# Patient Record
Sex: FEMALE | Race: ASIAN | NOT HISPANIC OR LATINO | URBAN - METROPOLITAN AREA
[De-identification: names, ages, dates, MRNs, and addresses within clinical notes are randomized per-mention and may not be internally consistent; named-entity substitution may affect disease eponyms.]

---

## 2020-05-26 LAB
ABORH_EXT (HISTORICAL CONVERSION): NORMAL
ANTIBODY_EXT (HISTORICAL CONVERSION): NORMAL

## 2020-10-06 LAB
ABORH_EXT (HISTORICAL CONVERSION): NORMAL
ANTIBODY_EXT (HISTORICAL CONVERSION): NORMAL
HBSAG_EXT (HISTORICAL CONVERSION): NORMAL
HCV AB SERPL QL IA: NORMAL
HGB_EXT (HISTORICAL CONVERSION): 10.8
HIV_EXT: NORMAL
PLT_EXT - HISTORICAL: 267
RUBELLA_EXT (HISTORICAL CONVERSION): NORMAL

## 2020-11-25 LAB — GBS_EXT (HISTORICAL CONVERSION): POSITIVE

## 2020-12-28 ENCOUNTER — HOSPITAL ENCOUNTER (OUTPATIENT)
Dept: OBGYN | Facility: HOSPITAL | Age: 35
Discharge: HOME | End: 2020-12-28
Attending: OBSTETRICS & GYNECOLOGY | Admitting: ADVANCED PRACTICE MIDWIFE

## 2020-12-28 LAB — SARS-COV-2 PCR RESULT-HE - HISTORICAL: NEGATIVE

## 2020-12-28 ASSESSMENT — MIFFLIN-ST. JEOR: SCORE: 1517.35

## 2020-12-31 ENCOUNTER — HOME CARE/HOSPICE - HEALTHEAST (OUTPATIENT)
Dept: HOME HEALTH SERVICES | Facility: HOME HEALTH | Age: 35
End: 2020-12-31

## 2021-01-03 ENCOUNTER — HOME CARE/HOSPICE - HEALTHEAST (OUTPATIENT)
Dept: HOME HEALTH SERVICES | Facility: HOME HEALTH | Age: 36
End: 2021-01-03

## 2021-02-24 ENCOUNTER — COMMUNICATION - HEALTHEAST (OUTPATIENT)
Dept: ADMINISTRATIVE | Facility: CLINIC | Age: 36
End: 2021-02-24

## 2021-05-27 VITALS
TEMPERATURE: 98.2 F | DIASTOLIC BLOOD PRESSURE: 72 MMHG | SYSTOLIC BLOOD PRESSURE: 118 MMHG | HEART RATE: 70 BPM | RESPIRATION RATE: 18 BRPM

## 2021-06-05 VITALS — HEIGHT: 65 IN | WEIGHT: 180 LBS | BODY MASS INDEX: 29.99 KG/M2

## 2021-06-14 NOTE — PROGRESS NOTES
Spoke with Cullen REYES, she will be back to [facility] this afternoon to discuss plans with patient.  ERIC spoke with Dr Rosales who recommends patient stay to be induced - Cullen REYES would like to talk with patient about plans and what her and spouse would like to do after BPP at 1700.  Will let oncoming RN know the plans.

## 2021-06-14 NOTE — DISCHARGE SUMMARY
"S: Nicole is sitting up in bed, comfortable.  SO at bedside.  O: /72 (Patient Position: Semi-glover, Cuff Size: Adult Regular)   Pulse 91   Temp 98.3  F (36.8  C) (Oral)   Resp 16   Ht 5' 5\" (1.651 m)   Wt 180 lb (81.6 kg)   SpO2 98%   BMI 29.95 kg/m    FHR Cat I  Ctx occ mild  Cx 1/60/-1  BPP 8/8  A: IUP at 40w5d with BPP 4/8 in clinic and repeat 10/10 in hospital  P: Lengthy discussion on benefit and risks again with IOL.  Disc post dates gestation and little to gain with waiting for labor.  Disc potential risks to baby.  Disc recommended plan for IOL including ripening tonight and pitocin tomorrow if cervix is favorable.  Pt and  spoke in private with her .  Pt states that she was not prepared for IOL today and in general wishes to avoid IOL.  Pt feels reassured with repeat BPP and would like to go home tonight.  Pt agrees to schedule IOL in a few days if she does not start labor before that time.  Pt has clinic appt tomorrow.  IOL sched for Thursday.  Covid collected tonight.      YANCI Madrid,ERIC    "

## 2021-06-14 NOTE — PROGRESS NOTES
"S: iNcole is a 36yo  at 40wks 5d who presents to [facility] for evaluation after having a BPP in clinic today .  Off for movement and tone.  Indication for the BPP was 40wks pregnancy.  Nicole denies any pain, cramping, vaginal bleeding, leaking of fluid.  Pt reports that she does feel fetal movement. Pt is accompanied by her .  O: /72 (Patient Position: Semi-glover, Cuff Size: Adult Regular)   Pulse 91   Temp 98.3  F (36.8  C) (Oral)   Resp 16   Ht 5' 5\" (1.651 m)   Wt 180 lb (81.6 kg)   SpO2 98%   BMI 29.95 kg/m    FHR Cat I +accels   Ctx occasional, mild to palp  Cx exam deferred at this time  A: IUP at 40w5d with BPP  in clinic today  P: Disc with patient and her  that the ob/gyn on call recommends induction of labor due to her gestational age and biophysical score earlier.  Disc with pt that we could proceed with IOL at this time.  Pt states that she was not planning that and she would like to have the baby checked again and think about it.  Pt to US.  Will discuss with pt ultrasound findings and benefit/risks of going home vs induction of labor    Regina Berman CNM  "

## 2021-06-14 NOTE — PROGRESS NOTES
Patient arrived from clinic for evaluation after BPP 4/8.  Monitors placed x2 and patient and spouse oriented to room.

## 2021-06-14 NOTE — PLAN OF CARE
Reactive NST noted with multiple large accels and no decelerations. BPP showed 8/8 results. CNM discussing results with pt. For plan of care.  Tresa Knight Rn

## 2021-06-16 PROBLEM — Z34.90 PREGNANT: Status: ACTIVE | Noted: 2020-12-29

## 2022-04-05 LAB
ABO (EXTERNAL): NORMAL
HEMOGLOBIN (EXTERNAL): 12 G/DL (ref 11.7–15.5)
HEPATITIS B SURFACE ANTIGEN (EXTERNAL): NONREACTIVE
HEPATITIS C ANTIBODY (EXTERNAL): NONREACTIVE
HIV1+2 AB SERPL QL IA: NONREACTIVE
PLATELET COUNT (EXTERNAL): 289 10E3/UL (ref 140–400)
RH (EXTERNAL): POSITIVE
RUBELLA ANTIBODY IGG (EXTERNAL): NORMAL
TREPONEMA PALLIDUM ANTIBODY (EXTERNAL): NONREACTIVE

## 2022-08-31 LAB
HEMOGLOBIN (EXTERNAL): 10.8 G/DL (ref 11.7–15.5)
PLATELET COUNT (EXTERNAL): 248 10E3/UL (ref 140–400)

## 2022-10-27 LAB — GROUP B STREPTOCOCCUS (EXTERNAL): NEGATIVE

## 2022-11-11 ENCOUNTER — HOSPITAL ENCOUNTER (INPATIENT)
Facility: CLINIC | Age: 37
LOS: 2 days | Discharge: HOME | End: 2022-11-13
Attending: ADVANCED PRACTICE MIDWIFE | Admitting: ADVANCED PRACTICE MIDWIFE
Payer: COMMERCIAL

## 2022-11-11 PROBLEM — O42.90 AMNIOTIC FLUID LEAKING: Status: ACTIVE | Noted: 2022-11-11

## 2022-11-11 LAB
ABO/RH(D): NORMAL
ANTIBODY SCREEN: NEGATIVE
ERYTHROCYTE [DISTWIDTH] IN BLOOD BY AUTOMATED COUNT: 13.7 % (ref 10–15)
HCT VFR BLD AUTO: 34.6 % (ref 35–47)
HGB BLD-MCNC: 11.6 G/DL (ref 11.7–15.7)
MCH RBC QN AUTO: 27 PG (ref 26.5–33)
MCHC RBC AUTO-ENTMCNC: 33.5 G/DL (ref 31.5–36.5)
MCV RBC AUTO: 81 FL (ref 78–100)
PLATELET # BLD AUTO: 261 10E3/UL (ref 150–450)
RBC # BLD AUTO: 4.29 10E6/UL (ref 3.8–5.2)
RUPTURE OF FETAL MEMBRANES BY ROM PLUS: POSITIVE
SARS-COV-2 RNA RESP QL NAA+PROBE: NEGATIVE
SPECIMEN EXPIRATION DATE: NORMAL
WBC # BLD AUTO: 15.6 10E3/UL (ref 4–11)

## 2022-11-11 PROCEDURE — 120N000001 HC R&B MED SURG/OB

## 2022-11-11 PROCEDURE — 86780 TREPONEMA PALLIDUM: CPT | Performed by: ADVANCED PRACTICE MIDWIFE

## 2022-11-11 PROCEDURE — 250N000009 HC RX 250: Performed by: ADVANCED PRACTICE MIDWIFE

## 2022-11-11 PROCEDURE — 36415 COLL VENOUS BLD VENIPUNCTURE: CPT | Performed by: ADVANCED PRACTICE MIDWIFE

## 2022-11-11 PROCEDURE — 85027 COMPLETE CBC AUTOMATED: CPT | Performed by: ADVANCED PRACTICE MIDWIFE

## 2022-11-11 PROCEDURE — 258N000003 HC RX IP 258 OP 636: Performed by: ADVANCED PRACTICE MIDWIFE

## 2022-11-11 PROCEDURE — 86850 RBC ANTIBODY SCREEN: CPT | Performed by: ADVANCED PRACTICE MIDWIFE

## 2022-11-11 PROCEDURE — 3E033VJ INTRODUCTION OF OTHER HORMONE INTO PERIPHERAL VEIN, PERCUTANEOUS APPROACH: ICD-10-PCS | Performed by: ADVANCED PRACTICE MIDWIFE

## 2022-11-11 PROCEDURE — U0003 INFECTIOUS AGENT DETECTION BY NUCLEIC ACID (DNA OR RNA); SEVERE ACUTE RESPIRATORY SYNDROME CORONAVIRUS 2 (SARS-COV-2) (CORONAVIRUS DISEASE [COVID-19]), AMPLIFIED PROBE TECHNIQUE, MAKING USE OF HIGH THROUGHPUT TECHNOLOGIES AS DESCRIBED BY CMS-2020-01-R: HCPCS | Performed by: ADVANCED PRACTICE MIDWIFE

## 2022-11-11 PROCEDURE — 84112 EVAL AMNIOTIC FLUID PROTEIN: CPT | Performed by: ADVANCED PRACTICE MIDWIFE

## 2022-11-11 PROCEDURE — 86901 BLOOD TYPING SEROLOGIC RH(D): CPT | Performed by: ADVANCED PRACTICE MIDWIFE

## 2022-11-11 RX ORDER — OXYTOCIN/0.9 % SODIUM CHLORIDE 30/500 ML
100-340 PLASTIC BAG, INJECTION (ML) INTRAVENOUS CONTINUOUS PRN
Status: DISCONTINUED | OUTPATIENT
Start: 2022-11-11 | End: 2022-11-13 | Stop reason: HOSPADM

## 2022-11-11 RX ORDER — OXYTOCIN/0.9 % SODIUM CHLORIDE 30/500 ML
340 PLASTIC BAG, INJECTION (ML) INTRAVENOUS CONTINUOUS PRN
Status: COMPLETED | OUTPATIENT
Start: 2022-11-11 | End: 2022-11-12

## 2022-11-11 RX ORDER — LIDOCAINE 40 MG/G
CREAM TOPICAL
Status: DISCONTINUED | OUTPATIENT
Start: 2022-11-11 | End: 2022-11-12 | Stop reason: HOSPADM

## 2022-11-11 RX ORDER — PROCHLORPERAZINE MALEATE 10 MG
10 TABLET ORAL EVERY 6 HOURS PRN
Status: DISCONTINUED | OUTPATIENT
Start: 2022-11-11 | End: 2022-11-12 | Stop reason: HOSPADM

## 2022-11-11 RX ORDER — LIDOCAINE 40 MG/G
CREAM TOPICAL
Status: DISCONTINUED | OUTPATIENT
Start: 2022-11-11 | End: 2022-11-11 | Stop reason: HOSPADM

## 2022-11-11 RX ORDER — METHYLERGONOVINE MALEATE 0.2 MG/ML
200 INJECTION INTRAVENOUS
Status: DISCONTINUED | OUTPATIENT
Start: 2022-11-11 | End: 2022-11-12 | Stop reason: HOSPADM

## 2022-11-11 RX ORDER — ACETAMINOPHEN 325 MG/1
650 TABLET ORAL EVERY 4 HOURS PRN
Status: DISCONTINUED | OUTPATIENT
Start: 2022-11-11 | End: 2022-11-12 | Stop reason: HOSPADM

## 2022-11-11 RX ORDER — NALOXONE HYDROCHLORIDE 0.4 MG/ML
0.4 INJECTION, SOLUTION INTRAMUSCULAR; INTRAVENOUS; SUBCUTANEOUS
Status: DISCONTINUED | OUTPATIENT
Start: 2022-11-11 | End: 2022-11-12 | Stop reason: HOSPADM

## 2022-11-11 RX ORDER — ONDANSETRON 4 MG/1
4 TABLET, ORALLY DISINTEGRATING ORAL EVERY 6 HOURS PRN
Status: DISCONTINUED | OUTPATIENT
Start: 2022-11-11 | End: 2022-11-12 | Stop reason: HOSPADM

## 2022-11-11 RX ORDER — METOCLOPRAMIDE 10 MG/1
10 TABLET ORAL EVERY 6 HOURS PRN
Status: DISCONTINUED | OUTPATIENT
Start: 2022-11-11 | End: 2022-11-12 | Stop reason: HOSPADM

## 2022-11-11 RX ORDER — NALOXONE HYDROCHLORIDE 0.4 MG/ML
0.2 INJECTION, SOLUTION INTRAMUSCULAR; INTRAVENOUS; SUBCUTANEOUS
Status: DISCONTINUED | OUTPATIENT
Start: 2022-11-11 | End: 2022-11-12 | Stop reason: HOSPADM

## 2022-11-11 RX ORDER — OXYTOCIN/0.9 % SODIUM CHLORIDE 30/500 ML
1-24 PLASTIC BAG, INJECTION (ML) INTRAVENOUS CONTINUOUS
Status: DISCONTINUED | OUTPATIENT
Start: 2022-11-11 | End: 2022-11-12 | Stop reason: HOSPADM

## 2022-11-11 RX ORDER — IBUPROFEN 800 MG/1
800 TABLET, FILM COATED ORAL
Status: DISCONTINUED | OUTPATIENT
Start: 2022-11-11 | End: 2022-11-13 | Stop reason: HOSPADM

## 2022-11-11 RX ORDER — CARBOPROST TROMETHAMINE 250 UG/ML
250 INJECTION, SOLUTION INTRAMUSCULAR
Status: DISCONTINUED | OUTPATIENT
Start: 2022-11-11 | End: 2022-11-12 | Stop reason: HOSPADM

## 2022-11-11 RX ORDER — OXYCODONE HYDROCHLORIDE 5 MG/1
5 TABLET ORAL
Status: DISCONTINUED | OUTPATIENT
Start: 2022-11-11 | End: 2022-11-13 | Stop reason: HOSPADM

## 2022-11-11 RX ORDER — MISOPROSTOL 200 UG/1
800 TABLET ORAL
Status: DISCONTINUED | OUTPATIENT
Start: 2022-11-11 | End: 2022-11-12 | Stop reason: HOSPADM

## 2022-11-11 RX ORDER — KETOROLAC TROMETHAMINE 30 MG/ML
30 INJECTION, SOLUTION INTRAMUSCULAR; INTRAVENOUS
Status: DISCONTINUED | OUTPATIENT
Start: 2022-11-11 | End: 2022-11-13 | Stop reason: HOSPADM

## 2022-11-11 RX ORDER — METOCLOPRAMIDE HYDROCHLORIDE 5 MG/ML
10 INJECTION INTRAMUSCULAR; INTRAVENOUS EVERY 6 HOURS PRN
Status: DISCONTINUED | OUTPATIENT
Start: 2022-11-11 | End: 2022-11-12 | Stop reason: HOSPADM

## 2022-11-11 RX ORDER — FENTANYL CITRATE 50 UG/ML
50 INJECTION, SOLUTION INTRAMUSCULAR; INTRAVENOUS EVERY 30 MIN PRN
Status: DISCONTINUED | OUTPATIENT
Start: 2022-11-11 | End: 2022-11-12 | Stop reason: HOSPADM

## 2022-11-11 RX ORDER — SODIUM CHLORIDE, SODIUM LACTATE, POTASSIUM CHLORIDE, CALCIUM CHLORIDE 600; 310; 30; 20 MG/100ML; MG/100ML; MG/100ML; MG/100ML
INJECTION, SOLUTION INTRAVENOUS CONTINUOUS PRN
Status: DISCONTINUED | OUTPATIENT
Start: 2022-11-11 | End: 2022-11-12 | Stop reason: HOSPADM

## 2022-11-11 RX ORDER — MISOPROSTOL 200 UG/1
400 TABLET ORAL
Status: DISCONTINUED | OUTPATIENT
Start: 2022-11-11 | End: 2022-11-12 | Stop reason: HOSPADM

## 2022-11-11 RX ORDER — ONDANSETRON 2 MG/ML
4 INJECTION INTRAMUSCULAR; INTRAVENOUS EVERY 6 HOURS PRN
Status: DISCONTINUED | OUTPATIENT
Start: 2022-11-11 | End: 2022-11-12 | Stop reason: HOSPADM

## 2022-11-11 RX ORDER — TERBUTALINE SULFATE 1 MG/ML
0.25 INJECTION, SOLUTION SUBCUTANEOUS
Status: DISCONTINUED | OUTPATIENT
Start: 2022-11-11 | End: 2022-11-12 | Stop reason: HOSPADM

## 2022-11-11 RX ORDER — OXYTOCIN 10 [USP'U]/ML
10 INJECTION, SOLUTION INTRAMUSCULAR; INTRAVENOUS
Status: DISCONTINUED | OUTPATIENT
Start: 2022-11-11 | End: 2022-11-12 | Stop reason: HOSPADM

## 2022-11-11 RX ORDER — CITRIC ACID/SODIUM CITRATE 334-500MG
30 SOLUTION, ORAL ORAL
Status: DISCONTINUED | OUTPATIENT
Start: 2022-11-11 | End: 2022-11-12 | Stop reason: HOSPADM

## 2022-11-11 RX ORDER — OXYTOCIN 10 [USP'U]/ML
10 INJECTION, SOLUTION INTRAMUSCULAR; INTRAVENOUS
Status: DISCONTINUED | OUTPATIENT
Start: 2022-11-11 | End: 2022-11-13 | Stop reason: HOSPADM

## 2022-11-11 RX ORDER — PROCHLORPERAZINE 25 MG
25 SUPPOSITORY, RECTAL RECTAL EVERY 12 HOURS PRN
Status: DISCONTINUED | OUTPATIENT
Start: 2022-11-11 | End: 2022-11-12 | Stop reason: HOSPADM

## 2022-11-11 RX ADMIN — SODIUM CHLORIDE, POTASSIUM CHLORIDE, SODIUM LACTATE AND CALCIUM CHLORIDE: 600; 310; 30; 20 INJECTION, SOLUTION INTRAVENOUS at 20:31

## 2022-11-11 RX ADMIN — Medication 2 MILLI-UNITS/MIN: at 21:05

## 2022-11-11 ASSESSMENT — ACTIVITIES OF DAILY LIVING (ADL)
DIFFICULTY_COMMUNICATING: NO
ADLS_ACUITY_SCORE: 20
ADLS_ACUITY_SCORE: 20
DRESSING/BATHING_DIFFICULTY: NO
ADLS_ACUITY_SCORE: 35
CONCENTRATING,_REMEMBERING_OR_MAKING_DECISIONS_DIFFICULTY: NO
DOING_ERRANDS_INDEPENDENTLY_DIFFICULTY: NO
DIFFICULTY_EATING/SWALLOWING: NO
WEAR_GLASSES_OR_BLIND: YES
WALKING_OR_CLIMBING_STAIRS_DIFFICULTY: NO
VISION_MANAGEMENT: GLASSES
FALL_HISTORY_WITHIN_LAST_SIX_MONTHS: NO
ADLS_ACUITY_SCORE: 20
TOILETING_ISSUES: NO
HEARING_DIFFICULTY_OR_DEAF: NO
CHANGE_IN_FUNCTIONAL_STATUS_SINCE_ONSET_OF_CURRENT_ILLNESS/INJURY: NO

## 2022-11-11 NOTE — PROGRESS NOTES
Nicole is here to rule out rupture of membranes.  Nicole states at 1000 this AM she felt a gush and has since experienced more leaking.  Nicole denies having any painful contractions.  ROM+ collected.

## 2022-11-12 LAB — T PALLIDUM AB SER QL: NONREACTIVE

## 2022-11-12 PROCEDURE — 120N000001 HC R&B MED SURG/OB

## 2022-11-12 PROCEDURE — 250N000009 HC RX 250: Performed by: ADVANCED PRACTICE MIDWIFE

## 2022-11-12 PROCEDURE — C9803 HOPD COVID-19 SPEC COLLECT: HCPCS

## 2022-11-12 PROCEDURE — 0HQ9XZZ REPAIR PERINEUM SKIN, EXTERNAL APPROACH: ICD-10-PCS | Performed by: ADVANCED PRACTICE MIDWIFE

## 2022-11-12 PROCEDURE — 250N000013 HC RX MED GY IP 250 OP 250 PS 637: Performed by: ADVANCED PRACTICE MIDWIFE

## 2022-11-12 PROCEDURE — 722N000001 HC LABOR CARE VAGINAL DELIVERY SINGLE

## 2022-11-12 PROCEDURE — 250N000011 HC RX IP 250 OP 636: Performed by: ADVANCED PRACTICE MIDWIFE

## 2022-11-12 RX ORDER — DOCUSATE SODIUM 100 MG/1
100 CAPSULE, LIQUID FILLED ORAL DAILY
Status: DISCONTINUED | OUTPATIENT
Start: 2022-11-12 | End: 2022-11-13 | Stop reason: HOSPADM

## 2022-11-12 RX ORDER — NALOXONE HYDROCHLORIDE 0.4 MG/ML
0.2 INJECTION, SOLUTION INTRAMUSCULAR; INTRAVENOUS; SUBCUTANEOUS
Status: DISCONTINUED | OUTPATIENT
Start: 2022-11-12 | End: 2022-11-13 | Stop reason: HOSPADM

## 2022-11-12 RX ORDER — MISOPROSTOL 200 UG/1
400 TABLET ORAL
Status: DISCONTINUED | OUTPATIENT
Start: 2022-11-12 | End: 2022-11-13 | Stop reason: HOSPADM

## 2022-11-12 RX ORDER — ACETAMINOPHEN 325 MG/1
650 TABLET ORAL EVERY 4 HOURS PRN
Status: DISCONTINUED | OUTPATIENT
Start: 2022-11-12 | End: 2022-11-13 | Stop reason: HOSPADM

## 2022-11-12 RX ORDER — IBUPROFEN 800 MG/1
800 TABLET, FILM COATED ORAL EVERY 6 HOURS PRN
Status: DISCONTINUED | OUTPATIENT
Start: 2022-11-12 | End: 2022-11-13 | Stop reason: HOSPADM

## 2022-11-12 RX ORDER — OXYCODONE HYDROCHLORIDE 5 MG/1
5 TABLET ORAL EVERY 4 HOURS PRN
Status: DISCONTINUED | OUTPATIENT
Start: 2022-11-12 | End: 2022-11-13 | Stop reason: HOSPADM

## 2022-11-12 RX ORDER — MISOPROSTOL 200 UG/1
800 TABLET ORAL
Status: DISCONTINUED | OUTPATIENT
Start: 2022-11-12 | End: 2022-11-13 | Stop reason: HOSPADM

## 2022-11-12 RX ORDER — NALOXONE HYDROCHLORIDE 0.4 MG/ML
0.4 INJECTION, SOLUTION INTRAMUSCULAR; INTRAVENOUS; SUBCUTANEOUS
Status: DISCONTINUED | OUTPATIENT
Start: 2022-11-12 | End: 2022-11-13 | Stop reason: HOSPADM

## 2022-11-12 RX ORDER — HYDROCORTISONE 25 MG/G
CREAM TOPICAL 3 TIMES DAILY PRN
Status: DISCONTINUED | OUTPATIENT
Start: 2022-11-12 | End: 2022-11-13 | Stop reason: HOSPADM

## 2022-11-12 RX ORDER — MODIFIED LANOLIN
OINTMENT (GRAM) TOPICAL
Status: DISCONTINUED | OUTPATIENT
Start: 2022-11-12 | End: 2022-11-13 | Stop reason: HOSPADM

## 2022-11-12 RX ORDER — METHYLERGONOVINE MALEATE 0.2 MG/ML
200 INJECTION INTRAVENOUS
Status: DISCONTINUED | OUTPATIENT
Start: 2022-11-12 | End: 2022-11-13 | Stop reason: HOSPADM

## 2022-11-12 RX ORDER — CARBOPROST TROMETHAMINE 250 UG/ML
250 INJECTION, SOLUTION INTRAMUSCULAR
Status: DISCONTINUED | OUTPATIENT
Start: 2022-11-12 | End: 2022-11-13 | Stop reason: HOSPADM

## 2022-11-12 RX ORDER — BISACODYL 10 MG
10 SUPPOSITORY, RECTAL RECTAL DAILY PRN
Status: DISCONTINUED | OUTPATIENT
Start: 2022-11-12 | End: 2022-11-13 | Stop reason: HOSPADM

## 2022-11-12 RX ORDER — OXYTOCIN 10 [USP'U]/ML
10 INJECTION, SOLUTION INTRAMUSCULAR; INTRAVENOUS
Status: DISCONTINUED | OUTPATIENT
Start: 2022-11-12 | End: 2022-11-13 | Stop reason: HOSPADM

## 2022-11-12 RX ORDER — OXYTOCIN/0.9 % SODIUM CHLORIDE 30/500 ML
340 PLASTIC BAG, INJECTION (ML) INTRAVENOUS CONTINUOUS PRN
Status: DISCONTINUED | OUTPATIENT
Start: 2022-11-12 | End: 2022-11-13 | Stop reason: HOSPADM

## 2022-11-12 RX ADMIN — KETOROLAC TROMETHAMINE 30 MG: 30 INJECTION, SOLUTION INTRAMUSCULAR; INTRAVENOUS at 02:28

## 2022-11-12 RX ADMIN — IBUPROFEN 800 MG: 800 TABLET ORAL at 15:18

## 2022-11-12 RX ADMIN — LIDOCAINE HYDROCHLORIDE 20 ML: 10 INJECTION, SOLUTION EPIDURAL; INFILTRATION; INTRACAUDAL; PERINEURAL at 01:51

## 2022-11-12 RX ADMIN — IBUPROFEN 800 MG: 800 TABLET ORAL at 08:03

## 2022-11-12 RX ADMIN — DOCUSATE SODIUM 100 MG: 100 CAPSULE, LIQUID FILLED ORAL at 15:18

## 2022-11-12 RX ADMIN — IBUPROFEN 800 MG: 800 TABLET ORAL at 21:02

## 2022-11-12 RX ADMIN — Medication 340 ML/HR: at 01:45

## 2022-11-12 ASSESSMENT — ACTIVITIES OF DAILY LIVING (ADL)
ADLS_ACUITY_SCORE: 20

## 2022-11-12 NOTE — L&D DELIVERY NOTE
Vaginal Delivery Note    Name: Nicole Skelton  : 1985  MRN: [id number]    PRE DELIVERY DIAGNOSIS  1) 37 year old  2 Para 1001 at 38w4d      2) IOL for Prolonged ROM    POST DELIVERY DIAGNOSIS  1) 37 year old  @ 38w4d  2) Delivery of a viable infant weighing   via     YOB: 2021     Birth Time: 1:41 AM       Augmentation No              Indication: none  Induction Yes                      Indication: Prolonged ROM    Monitors: External     GBS: Negative    ROM: PROM  Fluid Type: Clear    Labor Analgesia/Anesthesia:Nitrous oxide    Cord pH obtained: No  Placenta Date/Time: 2022  1:47 AM   Placenta submitted to Pathology: No    Description of procedure:   37 year old  with C / [facility] and pregnancy complicated by AMA (37), COVID 19+ at 12 weeks, Bartholin Gland cysts present in pregnancy presented to L&D with spontaneous rupture of membranes.  Reported SROM with no contractions after at 1000 on . Cervix was 1/70/-2 upon admit at 1745. Pitocin was started at 2105. Started to get more uncomfortable with contractions and requested nitrous when cervix was 4/80/0. Cervix was relatively unchanged at 0120, patient was put into Welcher's position. Began to feel pressure like the baby was moving down, cervix 7.5cm. The next contraction she began to push. She brought her baby to a slow controlled crown. Fetal head delivered, restituted PAUL, followed by the left anterior shoulder with the next maternal pushing effort. Nicole Jones and Anya welcomed their daughter, Jelly. Her hospital course was uncomplicated.  Patient progressed to complete with pitocin   Shoulder Dystocia No  Operative Vaginal Delivery No  Infant spontaneously delivered over an 1st degree vaginal laceration.   It was repaired in the normal fashion using local anesthesia using 3-0 vicryl.  Infant delivered in PAUL position.  Nuchal cord No     After a 90 second delay cord clamped x2 and cut by the  ERIC per family request.  Placenta spontaneously delivered: 11/12/2022  1:47 AM  grossly intact with 3 vessel cord.  Infant:  Live, vigorous infant  was handed to mom.    Delivery was complicated by nothing Interventions included fundal massage and pitocin.    Delivery QBL (mL): 50    Mother and Infant stable.    Dr. Rubio available for consultation and collaboration as needed.    YANCI Chandler CNM    11/12/2022 2:22 AM

## 2022-11-12 NOTE — H&P
HISTORY AND PHYSICAL UPDATE ADMISSION EXAM    Name: Nicole Skelton  YOB: 1985  Medical Record Number: [id number]    History of Present Illness: Nicole Skelton is a 37 year old female who is 38w3d pregnant and being admitted for PROM at 1000 this AM. She reports some decreased fetal movement and denies contractions.    Estimated Date of Delivery: 2022    EGA 38w3d    OB History    Para Term  AB Living   2 1 1 0 0 1   SAB IAB Ectopic Multiple Live Births   0 0 0 0 1      # Outcome Date GA Lbr Sherman/2nd Weight Sex Delivery Anes PTL Lv   2 Current            1 Term 20 41w0d 11:32 / 01:56 3.4 kg (7 lb 7.9 oz) M Vag-Spont IV N BOBO      Complications: Fetal Intolerance      Name: JOSEPH,MALE-NICOLE      Apgar1: 7  Apgar5: 9        Lab Results   Component Value Date    HGB 11.6 (L) 2022       Prenatal Complications: AMA (37), COVID 19+ at 12 weeks, Bartholin Gland cysts present in pregnancy  PMH: Anxiety (on Paxil prior to pregnancy), Hypothyroid (stable without medications)  PSH: Thyroid lobectomy    Exam:      /77 (BP Location: Left arm, Patient Position: Semi-Martinez's, Cuff Size: Adult Regular)   Temp 98.2  F (36.8  C) (Oral)   Resp 16   LMP 2022   SpO2 99%     Fetal heart Rate 145 baseline, moderate variability, +accelerations, no decelerations  Contractions Rare    EFM tracing and patient status evaluated remotely due to patient care at another facility.    Assessment: PROM    Plan: Admit - see IP orders  Labor induction with Pitocin  Pain medication may have nitrous, IV fentanyl or epidural PRN  Dr. Rubio is available for consultation and collaboration as needed.  Anticipate     Prenatal record reviewed.    YANCI Chandler CNM      2022   8:38 PM

## 2022-11-12 NOTE — PROGRESS NOTES
S: James is breathing through her contractions. She is using nitrous for pain management.     O: /75   Temp 98.2  F (36.8  C) (Oral)   Resp 16   LMP 2022   SpO2 99%   EFM: 145 baseline, moderate variability, +accelerations, +occasional early decelerations  [place]: q2-4 minutes, lasting  Pitocin started at 2105, currently infusing at 4mU minute  SVE: 4/80/-1 per RN at 2249  Fluid clear    A: 37 year-old  at 38w4d IOL for PROM in latent labor    P: Continue to augment Pitocin to achieve an adequate contraction pattern. Will utilize peanut ball and position changes to assist with fetal descent. Continue toward  attempt.    Dr. Rubio remain available for consultation and collaboration as needed.    YANCI ChandlerM

## 2022-11-12 NOTE — PLAN OF CARE
Problem: Postpartum (Vaginal Delivery)  Goal: Optimal Pain Control and Function  Outcome: Progressing     Problem: Postpartum (Vaginal Delivery)  Goal: Effective Urinary Elimination  Outcome: Progressing     VSS, bldg light. Rates pain 2/10. Breastfeeding and using DBM. Attentive to infant. Partner at beside and supportive.

## 2022-11-12 NOTE — PROGRESS NOTES
RN at bedside for 15 minutes following Nitrous Oxide 50/50 inhalation initiation. Patient is observed using the equipment appropriately. Patient appears to be coping with labor. Patient is free of side effects

## 2022-11-13 VITALS
SYSTOLIC BLOOD PRESSURE: 117 MMHG | HEART RATE: 97 BPM | OXYGEN SATURATION: 97 % | RESPIRATION RATE: 16 BRPM | DIASTOLIC BLOOD PRESSURE: 77 MMHG | TEMPERATURE: 98.2 F

## 2022-11-13 PROCEDURE — 250N000013 HC RX MED GY IP 250 OP 250 PS 637: Performed by: ADVANCED PRACTICE MIDWIFE

## 2022-11-13 RX ORDER — DOCUSATE SODIUM 100 MG/1
100 CAPSULE, LIQUID FILLED ORAL DAILY
COMMUNITY
Start: 2022-11-13

## 2022-11-13 RX ORDER — IBUPROFEN 800 MG/1
800 TABLET, FILM COATED ORAL EVERY 8 HOURS PRN
COMMUNITY
Start: 2022-11-13

## 2022-11-13 RX ORDER — ACETAMINOPHEN 325 MG/1
650 TABLET ORAL EVERY 4 HOURS PRN
COMMUNITY
Start: 2022-11-13

## 2022-11-13 RX ADMIN — IBUPROFEN 800 MG: 800 TABLET ORAL at 12:42

## 2022-11-13 RX ADMIN — IBUPROFEN 800 MG: 800 TABLET ORAL at 07:10

## 2022-11-13 RX ADMIN — DOCUSATE SODIUM 100 MG: 100 CAPSULE, LIQUID FILLED ORAL at 12:42

## 2022-11-13 ASSESSMENT — ACTIVITIES OF DAILY LIVING (ADL)
ADLS_ACUITY_SCORE: 20

## 2022-11-13 NOTE — PLAN OF CARE
Problem: Postpartum (Vaginal Delivery)  Goal: Optimal Pain Control and Function  Outcome: Progressing     Problem: Breastfeeding  Goal: Effective Breastfeeding  Outcome: Progressing     Problem: Postpartum (Vaginal Delivery)  Goal: Hemostasis  Outcome: Progressing     VSS. Assessment WDL. Breastfeeding and pumping, appears to be bonding well with .

## 2022-11-13 NOTE — DISCHARGE SUMMARY
OB Discharge Summary      Date:  2022    Name:  Nicole Skelton  :  1985  MRN:  [id number]      Admission Date:  2022  Delivery Date: 2022   Gestational Age at Delivery:  38w4d  Discharge Date:  2022    Principal Diagnosis:    Patient Active Problem List   Diagnosis     Pregnant     Amniotic fluid leaking         Conditions complicating Pregnancy:  AMA (37), COVID 19+ at 12 weeks, Bartholin Gland cysts present in pregnancy    Procedure(s) Performed:  Pitocin, 1st degree    Indication for :  none  Indication for Induction:  Prolonged ROM     Condition at Discharge:  stable    Discharge Medications:      Review of your medicines      START taking      Dose / Directions   acetaminophen 325 MG tablet  Commonly known as: TYLENOL  Used for:  (normal spontaneous vaginal delivery)      Dose: 650 mg  Take 2 tablets (650 mg) by mouth every 4 hours as needed for mild pain or fever (greater than or equal to 38  C /100.4  F (oral) or 38.5  C/ 101.4  F (core).)  Refills: 0     docusate sodium 100 MG capsule  Commonly known as: COLACE  Used for:  (normal spontaneous vaginal delivery)      Dose: 100 mg  Take 1 capsule (100 mg) by mouth daily  Refills: 0     ibuprofen 800 MG tablet  Commonly known as: ADVIL/MOTRIN  Used for:  (normal spontaneous vaginal delivery)      Dose: 800 mg  Take 1 tablet (800 mg) by mouth every 8 hours as needed for other (cramping)  Refills: 0        CONTINUE these medicines which have NOT CHANGED      Dose / Directions   ferrous sulfate 325 (65 Fe) MG tablet  Commonly known as: FEROSUL      Dose: 1 tablet  [FERROUS SULFATE 65 MG ELEMENTAL IRON] Take 1 tablet by mouth daily with breakfast.  Refills: 0     Prenatal Vit-Fe Fumarate-FA 29-1 MG Chew      [PRENATAL -IRON-FOLIC ACID 29 MG IRON- 1 MG CHEW] Chew daily.  Refills: 0           Where to get your medicines      Some of these will need a paper prescription and others can be bought over the counter.  Ask your nurse if you have questions.    You don't need a prescription for these medications    acetaminophen 325 MG tablet    docusate sodium 100 MG capsule    ibuprofen 800 MG tablet          Discharge Plan:    Follow up with /CNM:  At 6 weeks for routine PP visit   Patient Instructions:      Physical activity: as tolerated    Diet:  As tolertaed    Medication: see above    Other:        Physician/CNM: YANCI Pimentel CNM, Dr. remains available for consultation and collaboration as needed.      Name:  Nicole Skelton  :  1985  MRN:  [id number]

## 2022-11-13 NOTE — PROGRESS NOTES
Vaginal Delivery Postpartum Day 1    Patient Name:  Nicole Skelton  :      1985  MRN:      [id number]      Assessment:  Normal postpartum course.    Plan:  Continue current care. Discharge to home.      Subjective:  The patient feels well:  Voiding without difficulty, lochia normal, tolerating normal diet, ambulating without difficulty and passing flatus. Voiding independently without complication. Pain is well controlled with current medications.  The patient has no emotional concerns.  The baby is well and being fed by both breast and bottle.      YOB: 2021   Birth Time: 1:41 AM     Prenatal Complications include:  AMA (37), COVID 19+ at 12 weeks, Bartholin Gland cysts present in pregnancy    Objective:  /64 (BP Location: Left arm, Patient Position: Supine, Cuff Size: Adult Regular)   Pulse 98   Temp 98.3  F (36.8  C) (Oral)   Resp 16   LMP 2022   SpO2 97%   Breastfeeding Unknown   Patient Vitals for the past 24 hrs:   BP Temp Temp src Pulse Resp SpO2   22 0100 105/64 -- -- -- 16 97 %   22 2030 119/79 98.3  F (36.8  C) Oral -- 16 99 %   22 1230 127/74 -- -- 98 16 --       Exam: Patient A&O x 3. No acute distress, breathing unlabored.The amount and color of the lochia is appropriate for the duration of recovery. Uterine fundus is firm at U-1. Perineum: no significant edema      Lab Results   Component Value Date    AS Negative 2022    HGB 11.6 (L) 2022       Immunization History   Administered Date(s) Administered     DTaP, Unspecified 1987, 1987, 1987, 1989, 1990     HepA-Adult 2016     HepB, Unspecified 1992, 1992, 1993     Hib, Unspecified 1996     Influenza Vaccine, 6+MO IM (QUADRIVALENT W/PRESERVATIVES) 10/11/2022     MMR 1991, 1999     Poliovirus, inactivated (IPV) 1987, 1987, 1989, 1989     Tdap (Adacel,Boostrix) 1999       Provider:  Shakira  YANCI Perez CNM    Date:  11/13/2022  Time:  10:37 AM

## 2023-05-14 ENCOUNTER — HEALTH MAINTENANCE LETTER (OUTPATIENT)
Age: 38
End: 2023-05-14

## 2024-07-21 ENCOUNTER — HEALTH MAINTENANCE LETTER (OUTPATIENT)
Age: 39
End: 2024-07-21

## 2025-08-10 ENCOUNTER — HEALTH MAINTENANCE LETTER (OUTPATIENT)
Age: 40
End: 2025-08-10